# Patient Record
Sex: FEMALE | URBAN - METROPOLITAN AREA
[De-identification: names, ages, dates, MRNs, and addresses within clinical notes are randomized per-mention and may not be internally consistent; named-entity substitution may affect disease eponyms.]

---

## 2024-11-11 ENCOUNTER — OFFICE VISIT (OUTPATIENT)
Dept: URBAN - METROPOLITAN AREA CLINIC 17 | Facility: CLINIC | Age: 69
End: 2024-11-11

## 2024-11-14 ENCOUNTER — OFFICE VISIT (OUTPATIENT)
Dept: URBAN - METROPOLITAN AREA CLINIC 17 | Facility: CLINIC | Age: 69
End: 2024-11-14

## 2025-03-17 ENCOUNTER — OFFICE VISIT (OUTPATIENT)
Dept: URBAN - METROPOLITAN AREA CLINIC 17 | Facility: CLINIC | Age: 70
End: 2025-03-17

## 2025-03-27 ENCOUNTER — DASHBOARD ENCOUNTERS (OUTPATIENT)
Age: 70
End: 2025-03-27

## 2025-03-27 ENCOUNTER — OFFICE VISIT (OUTPATIENT)
Dept: URBAN - METROPOLITAN AREA CLINIC 17 | Facility: CLINIC | Age: 70
End: 2025-03-27
Payer: COMMERCIAL

## 2025-03-27 ENCOUNTER — LAB OUTSIDE AN ENCOUNTER (OUTPATIENT)
Dept: URBAN - METROPOLITAN AREA CLINIC 17 | Facility: CLINIC | Age: 70
End: 2025-03-27

## 2025-03-27 DIAGNOSIS — Z12.11 COLON CANCER SCREENING: ICD-10-CM

## 2025-03-27 DIAGNOSIS — K59.04 CHRONIC IDIOPATHIC CONSTIPATION: ICD-10-CM

## 2025-03-27 PROCEDURE — 99203 OFFICE O/P NEW LOW 30 MIN: CPT

## 2025-03-27 PROCEDURE — 99243 OFF/OP CNSLTJ NEW/EST LOW 30: CPT

## 2025-03-27 RX ORDER — AMLODIPINE BESYLATE 10 MG/1
TABLET ORAL
Qty: 90 TABLET | Status: ACTIVE | COMMUNITY

## 2025-03-27 NOTE — HPI-TODAY'S VISIT:
New patient 70 year old female with PMH of HTN referred by Dr. Annelise Gordon. Patient is here for colon cancer screening. Last colonoscopy was over 10 years ago. No personal history of polyps. No GI symptoms. No family history of colon cancer or other GI malignancy. States she can go 3 days without a BM. Decline oral medications but willing to try Miralax.

## 2025-03-29 PROBLEM — 82934008: Status: ACTIVE | Noted: 2025-03-29

## 2025-05-07 ENCOUNTER — OFFICE VISIT (OUTPATIENT)
Dept: URBAN - METROPOLITAN AREA SURGERY CENTER 16 | Facility: SURGERY CENTER | Age: 70
End: 2025-05-07